# Patient Record
Sex: FEMALE | Race: WHITE
[De-identification: names, ages, dates, MRNs, and addresses within clinical notes are randomized per-mention and may not be internally consistent; named-entity substitution may affect disease eponyms.]

---

## 2019-11-29 ENCOUNTER — HOSPITAL ENCOUNTER (EMERGENCY)
Dept: HOSPITAL 97 - ER | Age: 24
Discharge: HOME | End: 2019-11-29
Payer: COMMERCIAL

## 2019-11-29 VITALS — OXYGEN SATURATION: 99 % | SYSTOLIC BLOOD PRESSURE: 126 MMHG | DIASTOLIC BLOOD PRESSURE: 76 MMHG

## 2019-11-29 VITALS — TEMPERATURE: 98 F

## 2019-11-29 DIAGNOSIS — S91.312A: Primary | ICD-10-CM
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
## 2019-11-29 DIAGNOSIS — Z23: ICD-10-CM

## 2019-11-29 DIAGNOSIS — S61.532A: ICD-10-CM

## 2019-11-29 DIAGNOSIS — S61.432A: ICD-10-CM
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
## 2019-11-29 DIAGNOSIS — F17.210: ICD-10-CM
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
## 2019-11-29 PROCEDURE — 90471 IMMUNIZATION ADMIN: CPT

## 2019-11-29 PROCEDURE — 90714 TD VACC NO PRESV 7 YRS+ IM: CPT

## 2019-11-29 PROCEDURE — 0JQR0ZZ REPAIR LEFT FOOT SUBCUTANEOUS TISSUE AND FASCIA, OPEN APPROACH: ICD-10-PCS

## 2019-11-29 PROCEDURE — 99283 EMERGENCY DEPT VISIT LOW MDM: CPT

## 2019-11-29 NOTE — EDPHYS
Physician Documentation                                                                           

 [facility]                                                                 

Name: Pamela Shannon                                                                             

Age: 24 yrs                                                                                       

Sex: Female                                                                                       

: 1995                                                                                   

MRN: [id number]                                                                                   

Arrival Date: 2019                                                                          

Time: 11:16                                                                                       

Account#: [id number]                                                                            

Bed 9                                                                                             

Private MD:                                                                                       

ED Physician Jesus Herring                                                                       

HPI:                                                                                              

                                                                                             

13:37 This 24 yrs old  Female presents to ER via Wheelchair with complaints of Dog   kb  

      Bite.                                                                                       

13:37 The patient was bitten on the left heel and left hand and left wrist, by a dog, in an   kb  

      unprovoked manner, at a friend's house, outdoors. Onset: The symptoms/episode               

      began/occurred just prior to arrival. Secondary to the bite the patient reports an          

      abrasion, multiple lacerations, that are deep, with the longest being 8 cm(s), and the      

      total laceration length being 11 cm(s), pain, multiple puncture wounds, that are            

      superficial. Associated signs and symptoms: Pertinent positives: tenderness. Severity       

      of symptoms: At their worst the symptoms were moderate, in the emergency department the     

      symptoms are unchanged. The patient has not experienced similar symptoms in the past.       

      The patient has not recently seen a physician.                                              

                                                                                                  

OB/GYN:                                                                                           

11:46 LMP- Unknown                                                                            aa5 

                                                                                                  

Historical:                                                                                       

- Allergies:                                                                                      

11:46 No Known Allergies;                                                                     aa5 

- PMHx:                                                                                           

11:46 None;                                                                                   aa5 

- PSHx:                                                                                           

11:46 ; Tonsillectomy; Adenoids;                                                     aa5 

                                                                                                  

- Immunization history:: Last tetanus immunization: unknown.                                      

- Social history:: Smoking status: Patient uses tobacco products, smokes one pack                 

  cigarettes per day.                                                                             

- Ebola Screening: : No symptoms or risks identified at this time.                                

                                                                                                  

                                                                                                  

ROS:                                                                                              

13:36 Constitutional: Negative for fever, chills, and weight loss, Neck: Negative for injury, kb  

      pain, and swelling, Cardiovascular: Negative for chest pain, palpitations, and edema,       

      Respiratory: Negative for shortness of breath, cough, wheezing, and pleuritic chest         

      pain, Abdomen/GI: Negative for abdominal pain, nausea, vomiting, diarrhea, and              

      constipation, Back: Negative for injury and pain, MS/Extremity: Negative for injury and     

      deformity, Neuro: Negative for headache, weakness, numbness, tingling, and seizure.         

13:36 Skin: Positive for abrasion(s), laceration(s), puncture, of the left heel and left hand     

      and left wrist.                                                                             

                                                                                                  

Exam:                                                                                             

13:33 Constitutional:  This is a well developed, well nourished patient who is awake, alert,  kb  

      and in no acute distress. Head/Face:  Normocephalic, atraumatic. ENT:  Nares patent. No     

      nasal discharge, no septal abnormalities noted.  Tympanic membranes are normal and          

      external auditory canals are clear.  Oropharynx with no redness, swelling, or masses,       

      exudates, or evidence of obstruction, uvula midline.  Mucous membranes moist. Neck:         

      Trachea midline, no thyromegaly or masses palpated, and no cervical lymphadenopathy.        

      Supple, full range of motion without nuchal rigidity, or vertebral point tenderness.        

      No Meningismus. Chest/axilla:  Normal chest wall appearance and motion.  Nontender with     

      no deformity.  No lesions are appreciated. Cardiovascular:  Regular rate and rhythm         

      with a normal S1 and S2.  No gallops, murmurs, or rubs.  Normal PMI, no JVD.  No pulse      

      deficits. Respiratory:  Lungs have equal breath sounds bilaterally, clear to                

      auscultation and percussion.  No rales, rhonchi or wheezes noted.  No increased work of     

      breathing, no retractions or nasal flaring. Abdomen/GI:  Soft, non-tender, with normal      

      bowel sounds.  No distension or tympany.  No guarding or rebound.  No evidence of           

      tenderness throughout. MS/ Extremity:  Pulses equal, no cyanosis.  Neurovascular            

      intact.  Full, normal range of motion. Neuro:  Awake and alert, GCS 15, oriented to         

      person, place, time, and situation.  Cranial nerves II-XII grossly intact.  Motor           

      strength 5/5 in all extremities.  Sensory grossly intact.  Cerebellar exam normal.          

      Normal gait.                                                                                

13:33 Skin: injury, bite(s), superficial, deep, of the left wrist, left hand and left heel,       

      abrasions and punctures to left hand and wrist, no repairs needed; left posterior heel      

      has 8cm laceration and 3cm laceration that require repair. .                                

                                                                                                  

Vital Signs:                                                                                      

11:46  / 87; Pulse 87; Resp 18 S; Temp 98.0(TE); Pulse Ox 100% on R/A; Weight 116.12 kg aa5 

      (R); Height 6 ft. 0 in. (182.88 cm) (R); Pain 9/10;                                         

14:00  / 76; Pulse 88; Resp 16; Pulse Ox 99% on R/A;                                    hb  

11:46 Body Mass Index 34.72 (116.12 kg, 182.88 cm)                                            aa5 

                                                                                                  

Laceration:                                                                                       

14:52 Wound Repair of 8cm ( 3.1in ) subcutaneous laceration to left heel. Linear shaped..     kb  

      Distal neuro/vascular/tendon intact. Anesthesia: Wound infiltrated with 4 mls of 1%         

      lidocaine. Wound prep: Extensive cleansing with hibiclenz by me, Wound irrigation with      

      saline by me, soaked foot in betadine/ns mixture prior to cleaning. Skin closed with 9      

      4-0 Prolene using simple sutures and sterile technique. Dressed with Neosporin, 4x4's.      

      Patient tolerated well.                                                                     

14:52 Wound Repair of 3cm ( 1.2in ) subcutaneous laceration to left heel. Linear shaped..     kb  

      Distal neuro/vascular/tendon intact. Anesthesia: Wound infiltrated with 2 mls of 1%         

      lidocaine. Wound prep: Extensive cleansing with hibiclenz by me, Wound irrigation with      

      saline by me. Skin closed with 3 4-0 Prolene using simple sutures and sterile               

      technique. Dressed with Neosporin, 4x4's. Patient tolerated well.                           

                                                                                                  

MDM:                                                                                              

11:56 Patient medically screened.                                                             kb  

13:33 Data reviewed: vital signs, nurses notes. Data interpreted: Pulse oximetry: on room air kb  

      is 100 %. Interpretation: normal.                                                           

14:37 Counseling: I had a detailed discussion with the patient and/or guardian regarding: the kb  

      historical points, exam findings, and any diagnostic results supporting the                 

      discharge/admit diagnosis, the need for outpatient follow up, a family practitioner, to     

      return to the emergency department if symptoms worsen or persist or if there are any        

      questions or concerns that arise at home.                                                   

                                                                                                  

                                                                                             

12:26 Order name: Prolene, Sutures; Complete Time: 12:56                                      kb  

                                                                                             

12:26 Order name: Dressing - Wound; Complete Time: 15:19                                      kb  

                                                                                             

12:26 Order name: Gloves, Sterile; Complete Time: 12:56                                       kb  

                                                                                             

12:26 Order name: Setup Suture Tray; Complete Time: 12:56                                     kb  

                                                                                             

12:26 Order name: Misc. Order: report to PD; Complete Time: 12:36                             kb  

                                                                                             

13:24 Order name: Misc. Order: soak foot in 50/50 mixture of betadine and NS for 15 minutes;  kb  

      Complete Time: 14:54                                                                        

                                                                                                  

Administered Medications:                                                                         

12:56 Drug: Tetanus-Diphtheria Toxoid Adult 0.5 ml {: Aspire. Exp:         aa5 

      2021. Lot #: [id number]. } Route: IM; Site: left deltoid;                                  

13:30 Follow up: Response: No adverse reaction                                                hb  

13:10 Drug: Lidocaine (1 %) 1 vials Volume: 20 ml; Route: Infiltration;                       hb  

13:30 Follow up: Response: No adverse reaction                                                hb  

15:19 Drug: Augmentin 875 mg Route: PO;                                                       hb  

15:19 Follow up: Response: Medication administered at discharge.                              hb  

                                                                                                  

                                                                                                  

Disposition:                                                                                      

19 14:39 Discharged to Home. Impression: Bitten by dog, Laceration without foreign body     

  of foot.                                                                                        

- Condition is Stable.                                                                            

- Discharge Instructions: Animal Bite, Easy-to-Read, Laceration Care, Adult,                      

  Easy-to-Read.                                                                                   

- Prescriptions for Augmentin 875- 125 mg Oral Tablet - take 1 tablet by ORAL route               

  every 12 hours for 10 days; 20 tablet.                                                          

- Medication Reconciliation Form, Thank You Letter, Antibiotic Education, Prescription            

  Opioid Use form.                                                                                

- Follow up: Emergency Department; When: As needed; Reason: Worsening of condition.               

  Follow up: Private Physician; When: 2 - 3 days; Reason: Recheck today's complaints,             

  Continuance of care, Re-evaluation by your physician.                                           

                                                                                                  

                                                                                                  

                                                                                                  

Addendum:                                                                                         

2019                                                                                        

     10:07 Co-signature as Attending Physician, Jesus Herrign MD I agree with the assessment and   k
dr

           plan of care.                                                                          

                                                                                                  

Signatures:                                                                                       

Kiesha Hall, FNP-C                 FNP-Ckb                                                   

Jesus Herring MD MD   [facility]                                                  

Rosa Isela Treviño, RN                     RN   aa5                                                  

Aura Coles, DOTTIE                     RN                                                      

                                                                                                  

Corrections: (The following items were deleted from the chart)                                    

                                                                                             

15:21 14:39 2019 14:39 Discharged to Home. Impression: Bitten by dog; Laceration        hb  

      without foreign body of foot. Condition is Stable. Forms are Medication Reconciliation      

      Form, Thank You Letter, Antibiotic Education, Prescription Opioid Use. Follow up:           

      Emergency Department; When: As needed; Reason: Worsening of condition. Follow up:           

      Private Physician; When: 2 - 3 days; Reason: Recheck today's complaints, Continuance of     

      care, Re-evaluation by your physician. kb                                                   

                                                                                                  

**************************************************************************************************

## 2019-11-29 NOTE — ER
Nurse's Notes                                                                                     

 [facility]                                                                 

Name: Pamela Shannon                                                                             

Age: 24 yrs                                                                                       

Sex: Female                                                                                       

: 1995                                                                                   

MRN: [id number]                                                                                   

Arrival Date: 2019                                                                          

Time: 11:16                                                                                       

Account#: [id number]                                                                            

Bed 9                                                                                             

Private MD:                                                                                       

Diagnosis: Bitten by dog;Laceration without foreign body of foot                                  

                                                                                                  

Presentation:                                                                                     

                                                                                             

11:43 Care prior to arrival: None.                                                            aa5 

11:43 Presenting complaint: Patient states: "my friend's dog bit me about 30 minutes ago".    aa5 

      Bite noted to left hand and left ankle, no active bleeding noted. Transition of care:       

      patient was not received from another setting of care. Onset of symptoms was 2019. Risk Assessment: Do you want to hurt yourself or someone else? Patient            

      reports no desire to harm self or others. Initial Sepsis Screen: Does the patient meet      

      any 2 criteria? No. Patient's initial sepsis screen is negative. Does the patient have      

      a suspected source of infection? No. Patient's initial sepsis screen is negative.           

11:43 Acuity: JESÚS 4                                                                           aa5 

11:43 Method Of Arrival: Wheelchair                                                           aa5 

                                                                                                  

Triage Assessment:                                                                                

11:55 General: Appears uncomfortable, Behavior is calm, cooperative. Pain: Complains of pain  aa5 

      in left Achilles and left hand and left wrist Pain does not radiate. Pain currently is      

      9 out of 10 on a pain scale. Quality of pain is described as tender, throbbing, Is          

      continuous. EENT: No signs and/or symptoms were reported regarding the EENT system.         

      Neuro: Level of Consciousness is awake, alert, obeys commands, Oriented to person,          

      place, time, situation. Cardiovascular: Patient's skin is warm and dry. Respiratory:        

      Airway is patent Respiratory effort is even, unlabored, Respiratory pattern is regular,     

      symmetrical. GI: No signs and/or symptoms were reported involving the gastrointestinal      

      system. : No signs and/or symptoms were reported regarding the genitourinary system.      

      Derm: Skin is pink, warm \T\ dry. Dog bite noted to palmar aspect of left hand, left        

      wrist, and left achilles/left ankle. Laceration noted to left achilles. Cleaned with        

      hydrogen peroxide and saline, dressed with gauze and Kerlix. Musculoskeletal: Range of      

      motion: intact in all extremities.                                                          

12:38 Bite description: bite sustained to left hand and left ankle by a dog, tony ,       aa5 

      animal information: vaccination(s) is current.                                              

                                                                                                  

OB/GYN:                                                                                           

11:46 LMP- Unknown                                                                            aa5 

                                                                                                  

Historical:                                                                                       

- Allergies:                                                                                      

11:46 No Known Allergies;                                                                     aa5 

- PMHx:                                                                                           

11:46 None;                                                                                   aa5 

- PSHx:                                                                                           

11:46 ; Tonsillectomy; Adenoids;                                                     aa5 

                                                                                                  

- Immunization history:: Last tetanus immunization: unknown.                                      

- Social history:: Smoking status: Patient uses tobacco products, smokes one pack                 

  cigarettes per day.                                                                             

- Ebola Screening: : No symptoms or risks identified at this time.                                

                                                                                                  

                                                                                                  

Screenin:00 Abuse screen: Denies threats or abuse. Denies injuries from another. Nutritional        hb  

      screening: No deficits noted. Tuberculosis screening: No symptoms or risk factors           

      identified. Fall Risk None identified.                                                      

                                                                                                  

Assessment:                                                                                       

12:39 Reassessment: Pt reports dog bite occurred in [place], [place]. [place] [employer] was notified. .       aa5 

12:56 Reassessment: Patient is alert, oriented x 3, equal unlabored respirations, skin        aa5 

      warm/dry/pink. PD called back and stated pt is to follow-up at PD after d/c home. Pt        

      was notified and verbalizes understanding. .                                                

14:00 Reassessment: Patient appears in no apparent distress at this time. Patient and/or      hb  

      family updated on plan of care and expected duration. Pain level reassessed. Patient is     

      alert, oriented x 3, equal unlabored respirations, skin warm/dry/pink.                      

15:00 Reassessment: Patient appears in no apparent distress at this time. Patient and/or      hb  

      family updated on plan of care and expected duration. Pain level reassessed. Patient is     

      alert, oriented x 3, equal unlabored respirations, skin warm/dry/pink.                      

                                                                                                  

Vital Signs:                                                                                      

11:46  / 87; Pulse 87; Resp 18 S; Temp 98.0(TE); Pulse Ox 100% on R/A; Weight 116.12 kg aa5 

      (R); Height 6 ft. 0 in. (182.88 cm) (R); Pain 9/10;                                         

14:00  / 76; Pulse 88; Resp 16; Pulse Ox 99% on R/A;                                    hb  

11:46 Body Mass Index 34.72 (116.12 kg, 182.88 cm)                                            aa5 

                                                                                                  

ED Course:                                                                                        

11:16 Patient arrived in ED.                                                                  mr  

11:43 Arm band placed on.                                                                     aa5 

11:45 Triage completed.                                                                       aa5 

11:55 Rosa Isela Treviño, RN is Primary Nurse.                                                   aa5 

11:56 [facility]                                                        kb  

11:56 Jesus Herring MD is Attending Physician.                                              kb  

13:00 Patient has correct armband on for positive identification. Call light in reach.        hb  

15:20 No provider procedures requiring assistance completed. Patient did not have IV access   hb  

      during this emergency room visit.                                                           

                                                                                                  

Administered Medications:                                                                         

12:56 Drug: Tetanus-Diphtheria Toxoid Adult 0.5 ml {: [employer]. Exp:         aa5 

      2021. Lot #: [id number]. } Route: IM; Site: left deltoid;                                  

13:30 Follow up: Response: No adverse reaction                                                hb  

13:10 Drug: Lidocaine (1 %) 1 vials Volume: 20 ml; Route: Infiltration;                       hb  

13:30 Follow up: Response: No adverse reaction                                                hb  

15:19 Drug: Augmentin 875 mg Route: PO;                                                       hb  

15:19 Follow up: Response: Medication administered at discharge.                              hb  

                                                                                                  

                                                                                                  

Outcome:                                                                                          

14:39 Discharge ordered by MD.                                                                kb  

15:20 Discharged to home ambulatory, with family.                                             hb  

15:20 Condition: stable                                                                           

15:20 Discharge instructions given to patient, family, Instructed on discharge instructions,      

      follow up and referral plans. medication usage, wound care, Demonstrated understanding      

      of instructions, follow-up care, medications, wound care, Prescriptions given X 1.          

15:21 Patient left the ED.                                                                    hb  

                                                                                                  

Signatures:                                                                                       

Kiesha Hall FNP-C                 [facility]                                                   

Divya Mcdonald                                                   

Rosa Isela Treviño, RN                     RN   aa5                                                  

Aura Coles RN                     RN   hb                                                   

                                                                                                  

Corrections: (The following items were deleted from the chart)                                    

11:54 11:43 Presenting complaint: Patient states: "my friend's dog bit me about 30 minutes    aa5 

      ago". Bite noted to left hand, no active bleeding noted. aa5                                

                                                                                                  

**************************************************************************************************